# Patient Record
Sex: MALE | Race: WHITE | HISPANIC OR LATINO | URBAN - METROPOLITAN AREA
[De-identification: names, ages, dates, MRNs, and addresses within clinical notes are randomized per-mention and may not be internally consistent; named-entity substitution may affect disease eponyms.]

---

## 2023-06-02 ENCOUNTER — APPOINTMENT (RX ONLY)
Dept: URBAN - METROPOLITAN AREA CLINIC 148 | Facility: CLINIC | Age: 53
Setting detail: DERMATOLOGY
End: 2023-06-02

## 2023-06-02 DIAGNOSIS — L81.4 OTHER MELANIN HYPERPIGMENTATION: ICD-10-CM

## 2023-06-02 DIAGNOSIS — L82.1 OTHER SEBORRHEIC KERATOSIS: ICD-10-CM

## 2023-06-02 DIAGNOSIS — D49.2 NEOPLASM OF UNSPECIFIED BEHAVIOR OF BONE, SOFT TISSUE, AND SKIN: ICD-10-CM

## 2023-06-02 DIAGNOSIS — T07XXXA ABRASION OR FRICTION BURN OF OTHER, MULTIPLE, AND UNSPECIFIED SITES, WITHOUT MENTION OF INFECTION: ICD-10-CM | Status: INADEQUATELY CONTROLLED

## 2023-06-02 DIAGNOSIS — L30.4 ERYTHEMA INTERTRIGO: ICD-10-CM | Status: INADEQUATELY CONTROLLED

## 2023-06-02 PROBLEM — L30.9 DERMATITIS, UNSPECIFIED: Status: ACTIVE | Noted: 2023-06-02

## 2023-06-02 PROCEDURE — 99203 OFFICE O/P NEW LOW 30 MIN: CPT

## 2023-06-02 PROCEDURE — ? OBSERVATION

## 2023-06-02 PROCEDURE — ? RECOMMENDATIONS

## 2023-06-02 PROCEDURE — ? PRESCRIPTION

## 2023-06-02 PROCEDURE — ? COUNSELING

## 2023-06-02 PROCEDURE — ? SUNSCREEN RECOMMENDATIONS

## 2023-06-02 PROCEDURE — ? PRESCRIPTION MEDICATION MANAGEMENT

## 2023-06-02 PROCEDURE — ? FULL BODY SKIN EXAM

## 2023-06-02 RX ORDER — MUPIROCIN 20 MG/G
OINTMENT TOPICAL BID
Qty: 22 | Refills: 0 | Status: ERX | COMMUNITY
Start: 2023-06-02

## 2023-06-02 RX ORDER — NYSTATIN CREAM 100000 [USP'U]/G
CREAM TOPICAL
Qty: 30 | Refills: 0 | Status: ERX | COMMUNITY
Start: 2023-06-02

## 2023-06-02 RX ADMIN — MUPIROCIN: 20 OINTMENT TOPICAL at 00:00

## 2023-06-02 RX ADMIN — NYSTATIN CREAM: 100000 CREAM TOPICAL at 00:00

## 2023-06-02 ASSESSMENT — LOCATION SIMPLE DESCRIPTION DERM
LOCATION SIMPLE: LEFT FOREARM
LOCATION SIMPLE: CHEST
LOCATION SIMPLE: LEFT THIGH
LOCATION SIMPLE: RIGHT THIGH
LOCATION SIMPLE: PENIS
LOCATION SIMPLE: RIGHT UPPER BACK

## 2023-06-02 ASSESSMENT — LOCATION DETAILED DESCRIPTION DERM
LOCATION DETAILED: RIGHT MEDIAL UPPER BACK
LOCATION DETAILED: LEFT PROXIMAL DORSAL FOREARM
LOCATION DETAILED: LEFT ANTERIOR PROXIMAL THIGH
LOCATION DETAILED: DORSAL GLANS
LOCATION DETAILED: RIGHT MEDIAL INFERIOR CHEST
LOCATION DETAILED: LEFT ANTERIOR DISTAL THIGH
LOCATION DETAILED: RIGHT ANTERIOR DISTAL THIGH
LOCATION DETAILED: RIGHT SUPERIOR UPPER BACK
LOCATION DETAILED: RIGHT ANTERIOR PROXIMAL THIGH

## 2023-06-02 ASSESSMENT — BSA RASH: BSA RASH: 10

## 2023-06-02 ASSESSMENT — SEVERITY ASSESSMENT: SEVERITY: MILD TO MODERATE

## 2023-06-02 ASSESSMENT — LOCATION ZONE DERM
LOCATION ZONE: ARM
LOCATION ZONE: PENIS
LOCATION ZONE: TRUNK
LOCATION ZONE: LEG

## 2023-06-02 ASSESSMENT — PAIN INTENSITY VAS: HOW INTENSE IS YOUR PAIN 0 BEING NO PAIN, 10 BEING THE MOST SEVERE PAIN POSSIBLE?: 4/10 PAIN

## 2023-06-02 NOTE — PROCEDURE: MIPS QUALITY
Quality 110: Preventive Care And Screening: Influenza Immunization: Influenza Immunization previously received during influenza season
Quality 130: Documentation Of Current Medications In The Medical Record: Current Medications Documented
Quality 226: Preventive Care And Screening: Tobacco Use: Screening And Cessation Intervention: Patient screened for tobacco use and is an ex/non-smoker
Detail Level: Detailed
Quality 431: Preventive Care And Screening: Unhealthy Alcohol Use - Screening: Patient not identified as an unhealthy alcohol user when screened for unhealthy alcohol use using a systematic screening method
Quality 47: Advance Care Plan: Advance Care Planning discussed and documented; advance care plan or surrogate decision maker documented in the medical record.

## 2023-06-02 NOTE — PROCEDURE: RECOMMENDATIONS
Render Risk Assessment In Note?: yes
Detail Level: Zone
Recommendation Preamble: The following recommendations were made during the visit: Amlactin

## 2023-06-02 NOTE — PROCEDURE: PRESCRIPTION MEDICATION MANAGEMENT
Samples Given: Cerave foaming cleanser, Cetaphil foaming cleanser
Render In Strict Bullet Format?: No
Detail Level: Zone
Initiate Treatment: Mupirocin ointment BID to groin and genitalia for 2 weeks\\nNystatin cream BID to groin and genitalia for 2 weeks\\nCerave and Cetaphil foaming cleansers daily

## 2023-09-08 ENCOUNTER — OFFICE VISIT (OUTPATIENT)
Dept: URBAN - METROPOLITAN AREA CLINIC 63 | Facility: CLINIC | Age: 53
End: 2023-09-08
Payer: COMMERCIAL

## 2023-09-08 VITALS
HEIGHT: 69 IN | OXYGEN SATURATION: 96 % | BODY MASS INDEX: 29.33 KG/M2 | TEMPERATURE: 97.7 F | SYSTOLIC BLOOD PRESSURE: 110 MMHG | WEIGHT: 198 LBS | DIASTOLIC BLOOD PRESSURE: 80 MMHG | HEART RATE: 82 BPM

## 2023-09-08 DIAGNOSIS — E11.9 TYPE 2 DIABETES MELLITUS WITHOUT COMPLICATIONS: ICD-10-CM

## 2023-09-08 DIAGNOSIS — R19.7 DIARRHEA, UNSPECIFIED TYPE: ICD-10-CM

## 2023-09-08 DIAGNOSIS — K21.9 CHRONIC GERD: ICD-10-CM

## 2023-09-08 PROCEDURE — 99204 OFFICE O/P NEW MOD 45 MIN: CPT | Performed by: INTERNAL MEDICINE

## 2023-09-08 RX ORDER — LOSARTAN POTASSIUM 25 MG/1
1 TABLET TABLET ORAL ONCE A DAY
Status: ACTIVE | COMMUNITY

## 2023-09-08 RX ORDER — METFORMIN HYDROCHLORIDE 500 MG/1
1 TABLET WITH A MEAL TABLET, FILM COATED ORAL ONCE A DAY
Status: ACTIVE | COMMUNITY

## 2023-09-08 NOTE — HPI-TODAY'S VISIT:
Here to establish care. He has been having problems with his bowels, will be the last 3 months, he has been having more problems with diarrhea. He is having anywhere between 2-4 bowel movements a day, loose in nature, normal color. He has had some episodes of urgency and fecal leakage as well. Denies any crampy abdominal pain or discomfort. No melena or rectal bleeding reported. Denies any weight loss. He has had a colonoscopy about a year and a half ago up [place], had a single polyp. He has not started any new medications, does not take any supplements. Does drink occasional soda, occasionally has had increased bowel movements if he eats dairy.

## 2023-09-25 ENCOUNTER — LAB OUTSIDE AN ENCOUNTER (OUTPATIENT)
Dept: URBAN - METROPOLITAN AREA CLINIC 63 | Facility: CLINIC | Age: 53
End: 2023-09-25

## 2023-10-06 ENCOUNTER — WEB ENCOUNTER (OUTPATIENT)
Dept: URBAN - METROPOLITAN AREA CLINIC 57 | Facility: CLINIC | Age: 53
End: 2023-10-06

## 2023-10-06 ENCOUNTER — OFFICE VISIT (OUTPATIENT)
Dept: URBAN - METROPOLITAN AREA CLINIC 57 | Facility: CLINIC | Age: 53
End: 2023-10-06
Payer: COMMERCIAL

## 2023-10-06 VITALS
HEART RATE: 90 BPM | DIASTOLIC BLOOD PRESSURE: 70 MMHG | SYSTOLIC BLOOD PRESSURE: 110 MMHG | HEIGHT: 69 IN | OXYGEN SATURATION: 98 % | BODY MASS INDEX: 30.36 KG/M2 | WEIGHT: 205 LBS

## 2023-10-06 DIAGNOSIS — K21.9 CHRONIC GERD: ICD-10-CM

## 2023-10-06 DIAGNOSIS — R19.7 DIARRHEA, UNSPECIFIED TYPE: ICD-10-CM

## 2023-10-06 DIAGNOSIS — E11.9 TYPE 2 DIABETES MELLITUS WITHOUT COMPLICATIONS: ICD-10-CM

## 2023-10-06 PROCEDURE — 99214 OFFICE O/P EST MOD 30 MIN: CPT | Performed by: PHYSICIAN ASSISTANT

## 2023-10-06 RX ORDER — LOSARTAN POTASSIUM 25 MG/1
1 TABLET TABLET ORAL ONCE A DAY
Status: ACTIVE | COMMUNITY

## 2023-10-06 RX ORDER — METFORMIN HYDROCHLORIDE 500 MG/1
1 TABLET WITH A MEAL TABLET, FILM COATED ORAL ONCE A DAY
Status: ACTIVE | COMMUNITY

## 2023-10-06 RX ORDER — ATORVASTATIN CALCIUM 40 MG/1
1 TABLET TABLET, FILM COATED ORAL ONCE A DAY
Status: ACTIVE | COMMUNITY

## 2023-10-06 NOTE — HPI-TODAY'S VISIT:
Moody is a 53 year old male here to follow up diarrhea, here to review recent stool studies and dietary changes. He has discovered that he was taking his metformin at a double dose once the prescriber changed the pills from 500 mg tablets to 1000 mg tablets. After this change, he was accidently taking 2000 mg twice a day. He has had significant improvement in his diarrhea since cutting the dose in half.  Now having 2-3 BMs per day and stool is Mena 4 and is consistent. He has added one capsule of psyllium fiber per day for one week. He usually drinks 2 gallons of water per day. Denies hematochezia or melena. Denies any abdominal pain   Stool studies negative for infection. Pancreatic elastase 224, which is on the low end of normal.  LOV 9/8/23, RK: Diarrhea, unspecified type - R19.7 (Primary)2. Type 2 diabetes mellitus without complications - E11.93. Chronic GERD - K21.9Diarrhea/change in bowel habits-differential diagnosis includes diabetic diarrhea, IBS, infectious etiology versus microscopic colitis.Recommend a trial of lactose-free diet, and also asked him to add fiber supplementation with either Metamucil or Benefiber on a daily basis.We will check stool studies for infectious etiologies, pancreatic insufficiency as well.We will review evaluate in about 4 to 6 weeks time, if symptoms persist, will need to consider repeat colonoscopy and biopsies for further evaluation.

## 2023-10-11 LAB
C DIFFICILE TOXINS A+B, EIA: NEGATIVE
C DIFFICILE, CYTOTOXIN B: (no result)
CALPROTECTIN, FECAL: 95
CAMPYLOBACTER CULTURE: (no result)
E COLI SHIGA TOXIN EIA: NEGATIVE
GIARDIA LAMBLIA AG, EIA: NEGATIVE
Lab: (no result)
OVA + PARASITE EXAM: (no result)
PANCREATIC ELASTASE, FECAL: 224
SALMONELLA/SHIGELLA SCREEN: (no result)
WHITE BLOOD CELLS (WBC), STOOL: (no result)

## 2023-10-19 ENCOUNTER — TELEPHONE ENCOUNTER (OUTPATIENT)
Dept: URBAN - METROPOLITAN AREA CLINIC 64 | Facility: CLINIC | Age: 53
End: 2023-10-19

## 2024-01-03 ENCOUNTER — DASHBOARD ENCOUNTERS (OUTPATIENT)
Age: 54
End: 2024-01-03

## 2024-01-08 ENCOUNTER — OFFICE VISIT (OUTPATIENT)
Dept: URBAN - METROPOLITAN AREA CLINIC 60 | Facility: CLINIC | Age: 54
End: 2024-01-08

## 2024-06-27 ENCOUNTER — APPOINTMENT (RX ONLY)
Dept: URBAN - METROPOLITAN AREA CLINIC 148 | Facility: CLINIC | Age: 54
Setting detail: DERMATOLOGY
End: 2024-06-27

## 2024-06-27 DIAGNOSIS — L81.4 OTHER MELANIN HYPERPIGMENTATION: ICD-10-CM

## 2024-06-27 DIAGNOSIS — D492 NEOPLASM OF UNSPECIFIED NATURE OF BONE, SOFT TISSUE, AND SKIN: ICD-10-CM

## 2024-06-27 DIAGNOSIS — L82.1 OTHER SEBORRHEIC KERATOSIS: ICD-10-CM

## 2024-06-27 PROBLEM — R22.2 LOCALIZED SWELLING, MASS AND LUMP, TRUNK: Status: ACTIVE | Noted: 2024-06-27

## 2024-06-27 PROCEDURE — ? BIOPSY BY PUNCH METHOD

## 2024-06-27 PROCEDURE — ? COUNSELING

## 2024-06-27 PROCEDURE — 11104 PUNCH BX SKIN SINGLE LESION: CPT

## 2024-06-27 PROCEDURE — 99213 OFFICE O/P EST LOW 20 MIN: CPT | Mod: 25

## 2024-06-27 PROCEDURE — ? FULL BODY SKIN EXAM

## 2024-06-27 PROCEDURE — ? SUNSCREEN RECOMMENDATIONS

## 2024-06-27 ASSESSMENT — LOCATION DETAILED DESCRIPTION DERM
LOCATION DETAILED: LEFT ANTERIOR DISTAL THIGH
LOCATION DETAILED: RIGHT ANTERIOR DISTAL THIGH
LOCATION DETAILED: RIGHT MEDIAL UPPER BACK
LOCATION DETAILED: RIGHT SUPERIOR UPPER BACK
LOCATION DETAILED: LEFT INFERIOR LATERAL UPPER BACK
LOCATION DETAILED: RIGHT MEDIAL INFERIOR CHEST
LOCATION DETAILED: LEFT PROXIMAL DORSAL FOREARM

## 2024-06-27 ASSESSMENT — LOCATION SIMPLE DESCRIPTION DERM
LOCATION SIMPLE: RIGHT UPPER BACK
LOCATION SIMPLE: CHEST
LOCATION SIMPLE: LEFT UPPER BACK
LOCATION SIMPLE: LEFT THIGH
LOCATION SIMPLE: LEFT FOREARM
LOCATION SIMPLE: RIGHT THIGH

## 2024-06-27 ASSESSMENT — LOCATION ZONE DERM
LOCATION ZONE: TRUNK
LOCATION ZONE: ARM
LOCATION ZONE: LEG

## 2024-06-27 NOTE — PROCEDURE: BIOPSY BY PUNCH METHOD
Detail Level: Detailed
Was A Bandage Applied: Yes
Punch Size In Mm: 4
Size Of Lesion In Cm (Optional): 0
Depth Of Punch Biopsy: dermis
Biopsy Type: H and E
Anesthesia Type: 1% lidocaine with epinephrine
Anesthesia Volume In Cc: 0.5
Hemostasis: Aluminum Chloride
Epidermal Sutures: 3-0 Monocryl
Wound Care: Bacitracin
Dressing: bandage
Suture Removal: 14 days
Patient Will Remove Sutures At Home?: No
Lab: 6
Lab Facility: [id number]
Consent: Written consent was obtained and risks were reviewed including but not limited to scarring, infection, bleeding, scabbing, incomplete removal, nerve damage and allergy to anesthesia.
Post-Care Instructions: I reviewed with the patient in detail post-care instructions. Patient is to keep the biopsy site dry overnight, wash with soap and water and then apply Vaseline twice daily until healed.
Home Suture Removal Text: N/A
Notification Instructions: Patient is to keep follow up appointment to discuss biopsy results.
Billing Type: Third-Party Bill
Information: Selecting Yes will display possible errors in your note based on the variables you have selected. This validation is only offered as a suggestion for you. PLEASE NOTE THAT THE VALIDATION TEXT WILL BE REMOVED WHEN YOU FINALIZE YOUR NOTE. IF YOU WANT TO FAX A PRELIMINARY NOTE YOU WILL NEED TO TOGGLE THIS TO 'NO' IF YOU DO NOT WANT IT IN YOUR FAXED NOTE.

## 2025-06-13 ENCOUNTER — APPOINTMENT (OUTPATIENT)
Dept: URBAN - METROPOLITAN AREA CLINIC 148 | Facility: CLINIC | Age: 55
Setting detail: DERMATOLOGY
End: 2025-06-13

## 2025-06-13 DIAGNOSIS — L82.1 OTHER SEBORRHEIC KERATOSIS: ICD-10-CM

## 2025-06-13 DIAGNOSIS — L81.4 OTHER MELANIN HYPERPIGMENTATION: ICD-10-CM

## 2025-06-13 PROCEDURE — ? FULL BODY SKIN EXAM

## 2025-06-13 PROCEDURE — ? SUNSCREEN RECOMMENDATIONS

## 2025-06-13 PROCEDURE — ? COUNSELING

## 2025-06-13 ASSESSMENT — LOCATION DETAILED DESCRIPTION DERM
LOCATION DETAILED: RIGHT SUPERIOR UPPER BACK
LOCATION DETAILED: LEFT PROXIMAL DORSAL FOREARM
LOCATION DETAILED: LEFT ANTERIOR DISTAL THIGH
LOCATION DETAILED: RIGHT MEDIAL INFERIOR CHEST
LOCATION DETAILED: RIGHT ANTERIOR DISTAL THIGH
LOCATION DETAILED: RIGHT MEDIAL UPPER BACK

## 2025-06-13 ASSESSMENT — LOCATION ZONE DERM
LOCATION ZONE: ARM
LOCATION ZONE: TRUNK
LOCATION ZONE: LEG

## 2025-06-13 ASSESSMENT — LOCATION SIMPLE DESCRIPTION DERM
LOCATION SIMPLE: RIGHT THIGH
LOCATION SIMPLE: RIGHT UPPER BACK
LOCATION SIMPLE: LEFT THIGH
LOCATION SIMPLE: LEFT FOREARM
LOCATION SIMPLE: CHEST